# Patient Record
(demographics unavailable — no encounter records)

---

## 2025-06-10 NOTE — PHYSICAL EXAM
[Well-appearing] : well-appearing [Normocephalic] : normocephalic [No dysmorphic facial features] : no dysmorphic facial features [No deformities] : no deformities [Alert] : alert [Conversant] : conversant [Normal speech and language] : normal speech and language [Follows instructions well] : follows instructions well [Midline tongue, no fasciculations] : midline tongue, no fasciculations [Gets up on table without difficulty] : gets up on table without difficulty [No pronator drift] : no pronator drift [No abnormal involuntary movements] : no abnormal involuntary movements [No dysmetria on FTNT] : no dysmetria on FTNT [Good walking balance] : good walking balance [Normal gait] : normal gait [Negative Romberg] : negative Romberg [de-identified] : no resp distress, no retractions  [de-identified] : intermittent eye contact [de-identified] : grossly intact [de-identified] : moving extremities symmetrically and against gravity [de-identified] : walks well

## 2025-06-10 NOTE — CONSULT LETTER
[Dear  ___] : Dear  [unfilled], [Courtesy Letter:] : I had the pleasure of seeing your patient, [unfilled], in my office today. [Please see my note below.] : Please see my note below. [Consult Closing:] : Thank you very much for allowing me to participate in the care of this patient.  If you have any questions, please do not hesitate to contact me. [Sincerely,] : Sincerely, [FreeTextEntry3] : Obehioya Irumudomon, MD  of Pediatric Neurology Co-Director of Pediatric Neuromuscular Clinic [employer] at [facility]

## 2025-06-10 NOTE — REASON FOR VISIT
[Home] : at home, [unfilled] , at the time of the visit. [Other Location: e.g. Home (Enter Location, City,State)___] : at [unfilled] [Telehealth (audio & video)] : This visit was provided via telehealth using real-time 2-way audio visual technology. [FreeTextEntry3] : mother [Follow-Up Evaluation] : a follow-up evaluation for [Autism] : Autism [FreeTextEntry2] : hypotonia [Mother] : mother
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
